# Patient Record
Sex: MALE | Race: WHITE | NOT HISPANIC OR LATINO | Employment: STUDENT | URBAN - METROPOLITAN AREA
[De-identification: names, ages, dates, MRNs, and addresses within clinical notes are randomized per-mention and may not be internally consistent; named-entity substitution may affect disease eponyms.]

---
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
## 2023-07-08 ENCOUNTER — OFFICE VISIT (OUTPATIENT)
Dept: URGENT CARE | Facility: CLINIC | Age: 23
End: 2023-07-08
Payer: COMMERCIAL

## 2023-07-08 VITALS
DIASTOLIC BLOOD PRESSURE: 71 MMHG | BODY MASS INDEX: 27.58 KG/M2 | SYSTOLIC BLOOD PRESSURE: 148 MMHG | HEART RATE: 84 BPM | WEIGHT: 203.6 LBS | RESPIRATION RATE: 20 BRPM | HEIGHT: 72 IN | TEMPERATURE: 98.7 F | OXYGEN SATURATION: 97 %

## 2023-07-08 DIAGNOSIS — L60.0 INGROWN RIGHT BIG TOENAIL: Primary | ICD-10-CM

## 2023-07-08 PROCEDURE — 99213 OFFICE O/P EST LOW 20 MIN: CPT

## 2023-07-08 NOTE — PROGRESS NOTES
[facility]        NAME: Ashok King is a 23 y.o. male  : 2000    MRN: [id number]  DATE: 2023  TIME: 6:33 PM    Assessment and Plan   Ingrown right big toenail [L60.0]  1. Ingrown right big toenail  Ambulatory Referral to Podiatry    mupirocin (BACTROBAN) 2 % ointment            Patient Instructions     Start mupirocin as prescribed  Warm soaks  Watch for signs of further infection as discussed  Keep area clean and try  Follow up with PCP in 3-5 days. Proceed to  ER if symptoms worsen. Chief Complaint     Chief Complaint   Patient presents with   • Nail Problem     Right foot toe, ingrown toenail big toe for 1 year         History of Present Illness       Patient is a 22 yo male with no significant PMH presenting in the clinic today for ingrown toenail x 1 year. Admits tenderness to palpation of the right great toe. Patient notes he has seen a podiatrist in the past in which the ingrown toenail was removed. Admits purulent drainage and swelling. Denies fever, chills, erythema, bruising, numbness, tingling, chest pain, and SOB. Denies the use of OTC treatment for symptom management. Review of Systems   Review of Systems   Constitutional: Negative for chills and fever. Respiratory: Negative for shortness of breath. Cardiovascular: Negative for chest pain. Skin: Positive for wound. Negative for rash. Neurological: Negative for numbness.          Current Medications       Current Outpatient Medications:   •  mupirocin (BACTROBAN) 2 % ointment, Apply topically 3 (three) times a day, Disp: 22 g, Rfl: 0  •  metoprolol tartrate (LOPRESSOR) 25 mg tablet, Take 1 tablet (25 mg total) by mouth every 12 (twelve) hours, Disp: 60 tablet, Rfl: 0    Current Allergies     Allergies as of 2023   • (No Known Allergies)            The following portions of the patient's history were reviewed and updated as appropriate: allergies, current medications, past family history, past medical history, past social history, past surgical history and problem list.     Past Medical History:   Diagnosis Date   • Sinus tachycardia        History reviewed. No pertinent surgical history. Family History   Family history unknown: Yes         Medications have been verified. Objective   /71   Pulse 84   Temp 98.7 °F (37.1 °C)   Resp 20   Ht 6' (1.829 m)   Wt 92.4 kg (203 lb 9.6 oz)   SpO2 97%   BMI 27.61 kg/m²        Physical Exam     Physical Exam  Vitals reviewed. Constitutional:       General: He is not in acute distress. Appearance: Normal appearance. He is normal weight. He is not ill-appearing. HENT:      Head: Normocephalic. Nose: Nose normal.      Mouth/Throat:      Mouth: Mucous membranes are moist.   Eyes:      Conjunctiva/sclera: Conjunctivae normal.   Cardiovascular:      Rate and Rhythm: Normal rate and regular rhythm. Pulses: Normal pulses. Heart sounds: Normal heart sounds. No murmur heard. No friction rub. No gallop. Pulmonary:      Effort: Pulmonary effort is normal.      Breath sounds: Normal breath sounds. No wheezing, rhonchi or rales. Musculoskeletal:        Feet:    Feet:      Right foot:      Skin integrity: Erythema and warmth present. Toenail Condition: Right toenails are ingrown. Comments: Mild drainage noted along medial aspect of the nail of the right great toe   Skin:     General: Skin is warm. Findings: No rash. Neurological:      Mental Status: He is alert.    Psychiatric:         Mood and Affect: Mood normal.         Behavior: Behavior normal.

## 2023-07-08 NOTE — PATIENT INSTRUCTIONS
Start mupirocin as prescribed  Warm soaks  Watch for signs of further infection as discussed  Keep area clean and try  Follow up with PCP in 3-5 days. Proceed to  ER if symptoms worsen.